# Patient Record
Sex: MALE | URBAN - METROPOLITAN AREA
[De-identification: names, ages, dates, MRNs, and addresses within clinical notes are randomized per-mention and may not be internally consistent; named-entity substitution may affect disease eponyms.]

---

## 2020-01-27 ENCOUNTER — EMERGENCY (EMERGENCY)
Facility: HOSPITAL | Age: 55
LOS: 1 days | Discharge: HOME | End: 2020-01-27
Admitting: STUDENT IN AN ORGANIZED HEALTH CARE EDUCATION/TRAINING PROGRAM
Payer: COMMERCIAL

## 2020-01-27 VITALS
OXYGEN SATURATION: 99 % | HEART RATE: 91 BPM | TEMPERATURE: 97 F | DIASTOLIC BLOOD PRESSURE: 83 MMHG | SYSTOLIC BLOOD PRESSURE: 156 MMHG | RESPIRATION RATE: 18 BRPM

## 2020-01-27 PROCEDURE — 73564 X-RAY EXAM KNEE 4 OR MORE: CPT | Mod: 26,LT

## 2020-01-27 PROCEDURE — 99283 EMERGENCY DEPT VISIT LOW MDM: CPT

## 2020-01-27 RX ORDER — IBUPROFEN 200 MG
600 TABLET ORAL ONCE
Refills: 0 | Status: COMPLETED | OUTPATIENT
Start: 2020-01-27 | End: 2020-01-27

## 2020-01-27 RX ADMIN — Medication 600 MILLIGRAM(S): at 12:07

## 2020-01-27 NOTE — ED PROVIDER NOTE - PHYSICAL EXAMINATION
MSK left knee:   Pain medially on palpation  Pain with extension  Limited ROM  No anterior knee pain  No swelling, erythema, crepitus

## 2020-01-27 NOTE — ED PROVIDER NOTE - PATIENT PORTAL LINK FT
You can access the FollowMyHealth Patient Portal offered by [facility] by registering at the following website: [email or web address]. By joining [employer]’s FollowMyHealth portal, you will also be able to view your health information using other applications (apps) compatible with our system.

## 2020-01-27 NOTE — ED PROVIDER NOTE - OBJECTIVE STATEMENT
53 y/o male pt no PMHx presents c/o left knee pain s/p mechanical fall at work today. Pt works for [employer] and was exiting truck when he stepped on unlevel ground and twisted his left knee and heard a pop. Pt landed on his left side. States mild pain at rest that he describes as a burning sensation and is worse with extending knee or walking. Pt feels his left knee will "give out" with bearing weight. Pt denies head trauma, LOC, chest pain, SOB, dizziness, or any other joint pain/injuries.

## 2020-01-27 NOTE — ED PROVIDER NOTE - CLINICAL SUMMARY MEDICAL DECISION MAKING FREE TEXT BOX
53 y/o male pt no PMHx c/o left knee pain s/p mechanical fall at work today. Pt stepped on unlevel ground, twisted left knee and heard a pop. Xray of left knee, pain control with Motrin, f/u orthopedics outpatient.

## 2021-02-25 PROBLEM — Z78.9 OTHER SPECIFIED HEALTH STATUS: Chronic | Status: ACTIVE | Noted: 2020-01-27

## 2021-03-22 ENCOUNTER — APPOINTMENT (OUTPATIENT)
Dept: GASTROENTEROLOGY | Facility: CLINIC | Age: 56
End: 2021-03-22

## 2023-05-22 NOTE — ED ADULT TRIAGE NOTE - CHIEF COMPLAINT QUOTE
pt c/o left knee pain after tripping on uneven pavement.  pt "heard a pop".  ambulatory to triage
ABO/UCG/Results in MD note/POCT Blood Glucose

## 2023-11-08 ENCOUNTER — APPOINTMENT (OUTPATIENT)
Dept: ORTHOPEDIC SURGERY | Facility: CLINIC | Age: 58
End: 2023-11-08
Payer: COMMERCIAL

## 2023-11-08 VITALS — BODY MASS INDEX: 36.53 KG/M2 | WEIGHT: 300 LBS | HEIGHT: 76 IN

## 2023-11-08 DIAGNOSIS — Z78.9 OTHER SPECIFIED HEALTH STATUS: ICD-10-CM

## 2023-11-08 DIAGNOSIS — I10 ESSENTIAL (PRIMARY) HYPERTENSION: ICD-10-CM

## 2023-11-08 PROCEDURE — 72100 X-RAY EXAM L-S SPINE 2/3 VWS: CPT

## 2023-11-08 PROCEDURE — 73502 X-RAY EXAM HIP UNI 2-3 VIEWS: CPT

## 2023-11-08 PROCEDURE — 99214 OFFICE O/P EST MOD 30 MIN: CPT

## 2023-11-08 RX ORDER — MELOXICAM 15 MG/1
15 TABLET ORAL
Qty: 30 | Refills: 1 | Status: ACTIVE | COMMUNITY
Start: 2023-11-08 | End: 1900-01-01

## 2024-01-03 ENCOUNTER — APPOINTMENT (OUTPATIENT)
Dept: ORTHOPEDIC SURGERY | Facility: CLINIC | Age: 59
End: 2024-01-03
Payer: COMMERCIAL

## 2024-01-03 VITALS — BODY MASS INDEX: 36.53 KG/M2 | WEIGHT: 300 LBS | HEIGHT: 76 IN

## 2024-01-03 PROCEDURE — 99214 OFFICE O/P EST MOD 30 MIN: CPT

## 2024-01-03 NOTE — HISTORY OF PRESENT ILLNESS
[Gradual] : gradual [Dull/Aching] : dull/aching [] : yes [4] : 4 [0] : 0 [de-identified] : 11/8/23: 57yo M with left lateral hip and groin pain, and left-sided lumbar pain for the past few months with no recent injury. Admits to radiating pain down the left leg; denies N/T, weakness, b/b incontinence. He has been seen by his PCP for this issue. Tried prednisone and tramadol with mild relief. He has also been treated by his chiropractor. Notes a similar episode a couple years ago that had resolved with medications, chiro, and rest.  1/3/23 f/u L hip, pain has improved, still feels a little weak on his left side, has been doing PT which has been helpful [FreeTextEntry1] : L hip  [de-identified] : Therapy

## 2024-01-03 NOTE — ASSESSMENT
[FreeTextEntry1] : 58M p/w L hip OA and lumbar radic  Continue PT OK to RTW light duty Continue NSAIDs for pain return  8weeks   The natural progression of Osteoarthritis was explained to the patient. We discussed the possible treatment options from conservative to operative. These included NSAIDS, Glucosamine and Chondrotin sulfate, and Physical Therapy. We also discussed that at some point they may progress to needed a GEORGES. Information and pamphlets were given.

## 2024-01-03 NOTE — DISCUSSION/SUMMARY
[de-identified] : The patient's current medication management of their orthopedic diagnosis was reviewed today:   (1) We discussed a comprehensive treatment plan that included possible pharmaceutical management involving the use of prescription strength medications including but not limited to options such as oral Naprosyn 500mg BID, once daily Meloxicam 15 mg, or 500-650 mg Tylenol versus over the counter oral medications and topical prescription NSAID Pennsaid vs over the counter Voltaren gel.   (2) There is a moderate risk of morbidity with further treatment, especially from use of prescription strength medications and possible side effects of these medications which include upset stomach with oral medications, skin reactions to topical medications and cardiac/renal issues with long term use.   (3) I recommended that the patient follow-up with their medical physician to discuss any significant specific potential issues with long term medication use such as interactions with current medications or with exacerbation of underlying medical comorbidities.   (4) The benefits and risks associated with use of injectable, oral or topical, prescription and over the counter anti-inflammatory medications were discussed with the patient. The patient voiced understanding of the risks including but not limited to bleeding, stroke, kidney dysfunction, heart disease, and were referred to the black box warning label for further information.  Prior to appointment and during encounter with patient extensive medical records were reviewed including but not limited to, hospital records, out patient records, imaging results, and lab data. During this appointment the patient was examined, diagnoses were discussed and explained in a face to face manner. In addition extensive time was spent reviewing aforementioned diagnostic studies. Counseling including abnormal image results, differential diagnoses, treatment options, risk and benefits, lifestyle changes, current condition, and current medications was performed. Patient's comments, questions, and concerns were address and patient verbalized understanding.

## 2024-02-16 ENCOUNTER — APPOINTMENT (OUTPATIENT)
Dept: ORTHOPEDIC SURGERY | Facility: CLINIC | Age: 59
End: 2024-02-16
Payer: COMMERCIAL

## 2024-02-16 VITALS — WEIGHT: 300 LBS | BODY MASS INDEX: 36.53 KG/M2 | HEIGHT: 76 IN

## 2024-02-16 PROCEDURE — 20610 DRAIN/INJ JOINT/BURSA W/O US: CPT | Mod: LT

## 2024-02-16 PROCEDURE — 99214 OFFICE O/P EST MOD 30 MIN: CPT | Mod: 25

## 2024-02-16 NOTE — PROCEDURE
[Large Joint Injection] : Large joint injection [Left] : of the left [Greater Trochanteric Bursa] : greater trochanteric bursa [Pain] : pain [Inflammation] : inflammation [Alcohol] : alcohol [Betadine] : betadine [Ethyl Chloride sprayed topically] : ethyl chloride sprayed topically [Sterile technique used] : sterile technique used [___ cc    0.5%] : Bupivacaine (Marcaine) ~Vcc of 0.5%  [___ cc    40mg] : Triamcinolone (Kenalog) ~Vcc of 40 mg  [Call if redness, pain or fever occur] : call if redness, pain or fever occur [Apply ice for 15min out of every hour for the next 12-24 hours as tolerated] : apply ice for 15 minutes out of every hour for the next 12-24 hours as tolerated [Previous OTC use and PT nontherapeutic] : patient has tried OTC's including aspirin, Ibuprofen, Aleve, etc or prescription NSAIDS, and/or exercises at home and/or physical therapy without satisfactory response [Risks, benefits, alternatives discussed / Verbal consent obtained] : the risks benefits, and alternatives have been discussed, and verbal consent was obtained

## 2024-02-16 NOTE — ASSESSMENT
[FreeTextEntry1] : 58M p/w L hip OA and lumbar radic  L GT CSI tolerated well Continue PT OK to RTW light duty Continue NSAIDs for pain return  8weeks   The natural progression of Osteoarthritis was explained to the patient. We discussed the possible treatment options from conservative to operative. These included NSAIDS, Glucosamine and Chondrotin sulfate, and Physical Therapy. We also discussed that at some point they may progress to needed a GEORGES. Information and pamphlets were given.    The risks, benefits, contents and alternatives to injection were explained in full to the patient. Risks outlined include but are not limited to infection, sepsis, bleeding, scarring, skin discoloration, temporary increase in pain, syncopal episode, failure to resolve symptoms, allergic reaction, flare reaction, permanent white skin discoloration, symptom recurrence, and elevation of blood sugar in diabetics. Patient understood the risks. All questions were answered. After discussion of options, patient requested an injection. Oral informed consent was obtained and sterile prep was done of the injection site. Sterile technique was used to introduce the mixture. Patient tolerated the procedure well. Patient advised to ice the injection site this evening. Signs and symptoms of infection reviewed and patient advised to call immediately for redness, fevers, and/or chills.

## 2024-02-16 NOTE — DISCUSSION/SUMMARY
[de-identified] : The patient's current medication management of their orthopedic diagnosis was reviewed today:   (1) We discussed a comprehensive treatment plan that included possible pharmaceutical management involving the use of prescription strength medications including but not limited to options such as oral Naprosyn 500mg BID, once daily Meloxicam 15 mg, or 500-650 mg Tylenol versus over the counter oral medications and topical prescription NSAID Pennsaid vs over the counter Voltaren gel.   (2) There is a moderate risk of morbidity with further treatment, especially from use of prescription strength medications and possible side effects of these medications which include upset stomach with oral medications, skin reactions to topical medications and cardiac/renal issues with long term use.   (3) I recommended that the patient follow-up with their medical physician to discuss any significant specific potential issues with long term medication use such as interactions with current medications or with exacerbation of underlying medical comorbidities.   (4) The benefits and risks associated with use of injectable, oral or topical, prescription and over the counter anti-inflammatory medications were discussed with the patient. The patient voiced understanding of the risks including but not limited to bleeding, stroke, kidney dysfunction, heart disease, and were referred to the black box warning label for further information.  Prior to appointment and during encounter with patient extensive medical records were reviewed including but not limited to, hospital records, out patient records, imaging results, and lab data. During this appointment the patient was examined, diagnoses were discussed and explained in a face to face manner. In addition extensive time was spent reviewing aforementioned diagnostic studies. Counseling including abnormal image results, differential diagnoses, treatment options, risk and benefits, lifestyle changes, current condition, and current medications was performed. Patient's comments, questions, and concerns were address and patient verbalized understanding.

## 2024-02-16 NOTE — HISTORY OF PRESENT ILLNESS
[Gradual] : gradual [4] : 4 [0] : 0 [Dull/Aching] : dull/aching [] : yes [de-identified] : 11/8/23: 59yo M with left lateral hip and groin pain, and left-sided lumbar pain for the past few months with no recent injury. Admits to radiating pain down the left leg; denies N/T, weakness, b/b incontinence. He has been seen by his PCP for this issue. Tried prednisone and tramadol with mild relief. He has also been treated by his chiropractor. Notes a similar episode a couple years ago that had resolved with medications, chiro, and rest.  1/3/23 f/u L hip, pain has improved, still feels a little weak on his left side, has been doing PT which has been helpful 2/16/24 pain continues to improve, Lateral pain continues, still having lateral pain [FreeTextEntry1] : L hip  [de-identified] : Therapy

## 2024-03-29 ENCOUNTER — APPOINTMENT (OUTPATIENT)
Dept: ORTHOPEDIC SURGERY | Facility: CLINIC | Age: 59
End: 2024-03-29
Payer: COMMERCIAL

## 2024-03-29 DIAGNOSIS — Z00.00 ENCOUNTER FOR GENERAL ADULT MEDICAL EXAMINATION W/OUT ABNORMAL FINDINGS: ICD-10-CM

## 2024-03-29 PROCEDURE — 99214 OFFICE O/P EST MOD 30 MIN: CPT

## 2024-03-29 NOTE — ASSESSMENT
[FreeTextEntry1] : 58M p/w L troch bursitis and OA  Hold PT pending GS clearance continue meloxicam for pain return 6-8 weeks   The patient was advised of the diagnosis. The natural history of the pathology was explained in full to the patient in layman's terms. All questions were answered. The risks and benefits of surgical and non-surgical treatment alternatives were explained in full to the patient.

## 2024-03-29 NOTE — HISTORY OF PRESENT ILLNESS
[Gradual] : gradual [4] : 4 [0] : 0 [Dull/Aching] : dull/aching [] : yes [de-identified] : 11/8/23: 59yo M with left lateral hip and groin pain, and left-sided lumbar pain for the past few months with no recent injury. Admits to radiating pain down the left leg; denies N/T, weakness, b/b incontinence. He has been seen by his PCP for this issue. Tried prednisone and tramadol with mild relief. He has also been treated by his chiropractor. Notes a similar episode a couple years ago that had resolved with medications, chiro, and rest.  1/3/23 f/u L hip, pain has improved, still feels a little weak on his left side, has been doing PT which has been helpful 2/16/24 pain continues to improve, Lateral pain continues, still having lateral pain 3/29/24: pt is here today for lt hip pain. He had to discontinue PT since last appointment since he required surgical intervention for  hernias 2 weeks ago. He has noticed improvement in the trochanter area since the injection. There was no recent injury. He is taking Advil.  [de-identified] : Therapy  [FreeTextEntry1] : L hip

## 2024-03-29 NOTE — DISCUSSION/SUMMARY
[de-identified] : The patient's current medication management of their orthopedic diagnosis was reviewed today:   (1) We discussed a comprehensive treatment plan that included possible pharmaceutical management involving the use of prescription strength medications including but not limited to options such as oral Naprosyn 500mg BID, once daily Meloxicam 15 mg, or 500-650 mg Tylenol versus over the counter oral medications and topical prescription NSAID Pennsaid vs over the counter Voltaren gel.   (2) There is a moderate risk of morbidity with further treatment, especially from use of prescription strength medications and possible side effects of these medications which include upset stomach with oral medications, skin reactions to topical medications and cardiac/renal issues with long term use.   (3) I recommended that the patient follow-up with their medical physician to discuss any significant specific potential issues with long term medication use such as interactions with current medications or with exacerbation of underlying medical comorbidities.   (4) The benefits and risks associated with use of injectable, oral or topical, prescription and over the counter anti-inflammatory medications were discussed with the patient. The patient voiced understanding of the risks including but not limited to bleeding, stroke, kidney dysfunction, heart disease, and were referred to the black box warning label for further information.  Prior to appointment and during encounter with patient extensive medical records were reviewed including but not limited to, hospital records, out patient records, imaging results, and lab data. During this appointment the patient was examined, diagnoses were discussed and explained in a face to face manner. In addition extensive time was spent reviewing aforementioned diagnostic studies. Counseling including abnormal image results, differential diagnoses, treatment options, risk and benefits, lifestyle changes, current condition, and current medications was performed. Patient's comments, questions, and concerns were address and patient verbalized understanding.

## 2024-04-03 RX ORDER — MELOXICAM 15 MG/1
15 TABLET ORAL
Qty: 30 | Refills: 1 | Status: ACTIVE | COMMUNITY
Start: 2024-04-03 | End: 1900-01-01

## 2024-05-24 ENCOUNTER — NON-APPOINTMENT (OUTPATIENT)
Age: 59
End: 2024-05-24

## 2024-05-24 ENCOUNTER — APPOINTMENT (OUTPATIENT)
Dept: ORTHOPEDIC SURGERY | Facility: CLINIC | Age: 59
End: 2024-05-24
Payer: COMMERCIAL

## 2024-05-24 DIAGNOSIS — M54.16 RADICULOPATHY, LUMBAR REGION: ICD-10-CM

## 2024-05-24 DIAGNOSIS — M16.12 UNILATERAL PRIMARY OSTEOARTHRITIS, LEFT HIP: ICD-10-CM

## 2024-05-24 DIAGNOSIS — M70.62 TROCHANTERIC BURSITIS, LEFT HIP: ICD-10-CM

## 2024-05-24 PROCEDURE — 99214 OFFICE O/P EST MOD 30 MIN: CPT

## 2024-05-24 NOTE — ASSESSMENT
[FreeTextEntry1] : 58M p/w L troch bursitis and OA  OK to return to PT before next hernia surgery continue meloxicam for pain return 6-8 weeks as needed   The patient was advised of the diagnosis. The natural history of the pathology was explained in full to the patient in layman's terms. All questions were answered. The risks and benefits of surgical and non-surgical treatment alternatives were explained in full to the patient.

## 2024-05-24 NOTE — HISTORY OF PRESENT ILLNESS
[Gradual] : gradual [4] : 4 [0] : 0 [Dull/Aching] : dull/aching [] : yes [de-identified] : 11/8/23: 57yo M with left lateral hip and groin pain, and left-sided lumbar pain for the past few months with no recent injury. Admits to radiating pain down the left leg; denies N/T, weakness, b/b incontinence. He has been seen by his PCP for this issue. Tried prednisone and tramadol with mild relief. He has also been treated by his chiropractor. Notes a similar episode a couple years ago that had resolved with medications, chiro, and rest.  1/3/23 f/u L hip, pain has improved, still feels a little weak on his left side, has been doing PT which has been helpful 2/16/24 pain continues to improve, Lateral pain continues, still having lateral pain 3/29/24: pt is here today for lt hip pain. He had to discontinue PT since last appointment since he required surgical intervention for  hernias 2 weeks ago. He has noticed improvement in the trochanter area since the injection. There was no recent injury. He is taking Advil.  5/24/24: pt is here today for follow up on left hip. pt state the hip is doing fine but he still has pain when going up and downstairs. pt also states he did have a hernia surgery which made him stop PT but he does want to continue PT. [FreeTextEntry1] : L hip  [de-identified] : Therapy

## 2024-07-26 ENCOUNTER — APPOINTMENT (OUTPATIENT)
Dept: ORTHOPEDIC SURGERY | Facility: CLINIC | Age: 59
End: 2024-07-26
Payer: COMMERCIAL

## 2024-07-26 DIAGNOSIS — M70.62 TROCHANTERIC BURSITIS, LEFT HIP: ICD-10-CM

## 2024-07-26 DIAGNOSIS — M16.12 UNILATERAL PRIMARY OSTEOARTHRITIS, LEFT HIP: ICD-10-CM

## 2024-07-26 DIAGNOSIS — M54.16 RADICULOPATHY, LUMBAR REGION: ICD-10-CM

## 2024-07-26 PROCEDURE — 99214 OFFICE O/P EST MOD 30 MIN: CPT

## 2024-07-26 NOTE — HISTORY OF PRESENT ILLNESS
[Gradual] : gradual [4] : 4 [0] : 0 [Dull/Aching] : dull/aching [] : yes [de-identified] : 11/8/23: 59yo M with left lateral hip and groin pain, and left-sided lumbar pain for the past few months with no recent injury. Admits to radiating pain down the left leg; denies N/T, weakness, b/b incontinence. He has been seen by his PCP for this issue. Tried prednisone and tramadol with mild relief. He has also been treated by his chiropractor. Notes a similar episode a couple years ago that had resolved with medications, chiro, and rest.  1/3/23 f/u L hip, pain has improved, still feels a little weak on his left side, has been doing PT which has been helpful 2/16/24 pain continues to improve, Lateral pain continues, still having lateral pain 3/29/24: pt is here today for lt hip pain. He had to discontinue PT since last appointment since he required surgical intervention for  hernias 2 weeks ago. He has noticed improvement in the trochanter area since the injection. There was no recent injury. He is taking Advil.  5/24/24: pt is here today for follow up on left hip. pt state the hip is doing fine but he still has pain when going up and downstairs. pt also states he did have a hernia surgery which made him stop PT but he does want to continue PT.  7/26/24: pt is here today for f/u on left hip pain. pt states the pain is continuing, unchanged. He received CSI 2/16/24, without relief. He is going to PT, with minimal improvement. of note, he currently has bilateral hernias, scheduled for surgery.  [FreeTextEntry1] : L hip  [de-identified] : Therapy

## 2024-07-26 NOTE — ASSESSMENT
[FreeTextEntry1] : 58M p/w L troch bursitis and OA  OK to return to PT before next hernia surgery continue meloxicam for pain return after hernia surgery   The patient was advised of the diagnosis. The natural history of the pathology was explained in full to the patient in layman's terms. All questions were answered. The risks and benefits of surgical and non-surgical treatment alternatives were explained in full to the patient.

## 2024-09-27 ENCOUNTER — APPOINTMENT (OUTPATIENT)
Dept: ORTHOPEDIC SURGERY | Facility: CLINIC | Age: 59
End: 2024-09-27

## 2024-10-11 ENCOUNTER — APPOINTMENT (OUTPATIENT)
Dept: ORTHOPEDIC SURGERY | Facility: CLINIC | Age: 59
End: 2024-10-11
Payer: COMMERCIAL

## 2024-10-11 DIAGNOSIS — M70.62 TROCHANTERIC BURSITIS, LEFT HIP: ICD-10-CM

## 2024-10-11 DIAGNOSIS — M16.12 UNILATERAL PRIMARY OSTEOARTHRITIS, LEFT HIP: ICD-10-CM

## 2024-10-11 PROCEDURE — 20610 DRAIN/INJ JOINT/BURSA W/O US: CPT | Mod: LT

## 2024-10-11 PROCEDURE — 99214 OFFICE O/P EST MOD 30 MIN: CPT | Mod: 25

## 2024-12-06 ENCOUNTER — APPOINTMENT (OUTPATIENT)
Dept: ORTHOPEDIC SURGERY | Facility: CLINIC | Age: 59
End: 2024-12-06

## 2024-12-27 ENCOUNTER — APPOINTMENT (OUTPATIENT)
Dept: ORTHOPEDIC SURGERY | Facility: CLINIC | Age: 59
End: 2024-12-27
Payer: COMMERCIAL

## 2024-12-27 DIAGNOSIS — M16.12 UNILATERAL PRIMARY OSTEOARTHRITIS, LEFT HIP: ICD-10-CM

## 2024-12-27 DIAGNOSIS — M70.62 TROCHANTERIC BURSITIS, LEFT HIP: ICD-10-CM

## 2024-12-27 PROCEDURE — 99214 OFFICE O/P EST MOD 30 MIN: CPT

## 2024-12-30 ENCOUNTER — RX RENEWAL (OUTPATIENT)
Age: 59
End: 2024-12-30

## 2025-02-28 ENCOUNTER — APPOINTMENT (OUTPATIENT)
Dept: ORTHOPEDIC SURGERY | Facility: CLINIC | Age: 60
End: 2025-02-28